# Patient Record
Sex: FEMALE | Race: BLACK OR AFRICAN AMERICAN | Employment: FULL TIME | URBAN - METROPOLITAN AREA
[De-identification: names, ages, dates, MRNs, and addresses within clinical notes are randomized per-mention and may not be internally consistent; named-entity substitution may affect disease eponyms.]

---

## 2021-03-26 ENCOUNTER — HOSPITAL ENCOUNTER (INPATIENT)
Age: 51
LOS: 1 days | Discharge: LEFT BEFORE DISCHARGE | DRG: 948 | End: 2021-03-27
Attending: EMERGENCY MEDICINE | Admitting: FAMILY MEDICINE

## 2021-03-26 DIAGNOSIS — I50.9 CONGESTIVE HEART FAILURE, UNSPECIFIED HF CHRONICITY, UNSPECIFIED HEART FAILURE TYPE (HCC): ICD-10-CM

## 2021-03-26 DIAGNOSIS — R60.9 EDEMA, UNSPECIFIED TYPE: Primary | ICD-10-CM

## 2021-03-26 DIAGNOSIS — J84.9 INTERSTITIAL LUNG DISEASE (HCC): ICD-10-CM

## 2021-03-26 PROCEDURE — 80053 COMPREHEN METABOLIC PANEL: CPT

## 2021-03-26 PROCEDURE — 84484 ASSAY OF TROPONIN QUANT: CPT

## 2021-03-26 PROCEDURE — 74011250636 HC RX REV CODE- 250/636: Performed by: EMERGENCY MEDICINE

## 2021-03-26 PROCEDURE — 36415 COLL VENOUS BLD VENIPUNCTURE: CPT

## 2021-03-26 PROCEDURE — 85025 COMPLETE CBC W/AUTO DIFF WBC: CPT

## 2021-03-26 PROCEDURE — 96374 THER/PROPH/DIAG INJ IV PUSH: CPT

## 2021-03-26 PROCEDURE — 99284 EMERGENCY DEPT VISIT MOD MDM: CPT

## 2021-03-26 PROCEDURE — 83880 ASSAY OF NATRIURETIC PEPTIDE: CPT

## 2021-03-26 RX ORDER — FUROSEMIDE 10 MG/ML
20 INJECTION INTRAMUSCULAR; INTRAVENOUS ONCE
Status: COMPLETED | OUTPATIENT
Start: 2021-03-26 | End: 2021-03-26

## 2021-03-26 RX ADMIN — FUROSEMIDE 20 MG: 10 INJECTION, SOLUTION INTRAMUSCULAR; INTRAVENOUS at 23:22

## 2021-03-26 NOTE — ED NOTES
Pt now reporting her SOB is no worse than usual and she does not wish to be evaluated for it. She is only interested in receiving a diuretic.

## 2021-03-26 NOTE — ED TRIAGE NOTES
Triage: Pt arrives from home accompanied by female  reporting shortness of breath. She reports she is on a transplant list with [facility] for a new lung. She Also reports some BLE edema. She was recently prescribed a diuretic but cannot recall the name. She is awaiting the arrival of this medication from her mail order service but it will not arrive for 7-10 days.

## 2021-03-26 NOTE — Clinical Note
Status[de-identified] INPATIENT [101]  Type of Bed: Telemetry [19]  Inpatient Hospitalization Certified Necessary for the Following Reasons: 3.  Patient receiving treatment that can only be provided in an inpatient setting (further clarification in H&P documentati on)  Admitting Diagnosis: Respiratory failure with hypoxia [facility]) [[id number]]  Admitting Physician: Karin Rod [[id number]]  Attending Physician: Karin Rod [[id number]]  Estimated Length of Stay: 3-4 Midnights  Discharge Plan[de-identified] Home with Office Celestine Mena

## 2021-03-27 ENCOUNTER — APPOINTMENT (OUTPATIENT)
Dept: GENERAL RADIOLOGY | Age: 51
DRG: 948 | End: 2021-03-27
Attending: FAMILY MEDICINE

## 2021-03-27 VITALS
RESPIRATION RATE: 20 BRPM | DIASTOLIC BLOOD PRESSURE: 77 MMHG | HEART RATE: 102 BPM | SYSTOLIC BLOOD PRESSURE: 110 MMHG | OXYGEN SATURATION: 100 % | WEIGHT: 225.31 LBS | BODY MASS INDEX: 36.37 KG/M2 | TEMPERATURE: 97.3 F

## 2021-03-27 PROBLEM — J96.91 RESPIRATORY FAILURE WITH HYPOXIA (HCC): Status: ACTIVE | Noted: 2021-03-27

## 2021-03-27 LAB
ALBUMIN SERPL-MCNC: 2.7 G/DL (ref 3.5–5)
ALBUMIN/GLOB SERPL: 0.5 {RATIO} (ref 1.1–2.2)
ALP SERPL-CCNC: 143 U/L (ref 45–117)
ALT SERPL-CCNC: 51 U/L (ref 12–78)
ANION GAP SERPL CALC-SCNC: 6 MMOL/L (ref 5–15)
ANION GAP SERPL CALC-SCNC: 9 MMOL/L (ref 5–15)
AST SERPL-CCNC: 47 U/L (ref 15–37)
BASOPHILS # BLD: 0 K/UL (ref 0–0.1)
BASOPHILS # BLD: 0 K/UL (ref 0–0.1)
BASOPHILS NFR BLD: 0 % (ref 0–1)
BASOPHILS NFR BLD: 1 % (ref 0–1)
BILIRUB SERPL-MCNC: 0.8 MG/DL (ref 0.2–1)
BNP SERPL-MCNC: 1691 PG/ML
BUN SERPL-MCNC: 3 MG/DL (ref 6–20)
BUN SERPL-MCNC: 3 MG/DL (ref 6–20)
BUN/CREAT SERPL: 12 (ref 12–20)
BUN/CREAT SERPL: 12 (ref 12–20)
CALCIUM SERPL-MCNC: 8.4 MG/DL (ref 8.5–10.1)
CALCIUM SERPL-MCNC: 8.7 MG/DL (ref 8.5–10.1)
CHLORIDE SERPL-SCNC: 93 MMOL/L (ref 97–108)
CHLORIDE SERPL-SCNC: 95 MMOL/L (ref 97–108)
CO2 SERPL-SCNC: 23 MMOL/L (ref 21–32)
CO2 SERPL-SCNC: 27 MMOL/L (ref 21–32)
CREAT SERPL-MCNC: 0.25 MG/DL (ref 0.55–1.02)
CREAT SERPL-MCNC: 0.26 MG/DL (ref 0.55–1.02)
DIFFERENTIAL METHOD BLD: ABNORMAL
DIFFERENTIAL METHOD BLD: ABNORMAL
EOSINOPHIL # BLD: 0.1 K/UL (ref 0–0.4)
EOSINOPHIL # BLD: 0.1 K/UL (ref 0–0.4)
EOSINOPHIL NFR BLD: 2 % (ref 0–7)
EOSINOPHIL NFR BLD: 2 % (ref 0–7)
ERYTHROCYTE [DISTWIDTH] IN BLOOD BY AUTOMATED COUNT: 14.7 % (ref 11.5–14.5)
ERYTHROCYTE [DISTWIDTH] IN BLOOD BY AUTOMATED COUNT: 15.1 % (ref 11.5–14.5)
GLOBULIN SER CALC-MCNC: 5.5 G/DL (ref 2–4)
GLUCOSE SERPL-MCNC: 75 MG/DL (ref 65–100)
GLUCOSE SERPL-MCNC: 88 MG/DL (ref 65–100)
HCT VFR BLD AUTO: 36.6 % (ref 35–47)
HCT VFR BLD AUTO: 37.7 % (ref 35–47)
HGB BLD-MCNC: 12 G/DL (ref 11.5–16)
HGB BLD-MCNC: 12.2 G/DL (ref 11.5–16)
IMM GRANULOCYTES # BLD AUTO: 0 K/UL
IMM GRANULOCYTES # BLD AUTO: 0 K/UL (ref 0–0.04)
IMM GRANULOCYTES NFR BLD AUTO: 0 %
IMM GRANULOCYTES NFR BLD AUTO: 1 % (ref 0–0.5)
LYMPHOCYTES # BLD: 0.7 K/UL (ref 0.8–3.5)
LYMPHOCYTES # BLD: 0.7 K/UL (ref 0.8–3.5)
LYMPHOCYTES NFR BLD: 20 % (ref 12–49)
LYMPHOCYTES NFR BLD: 23 % (ref 12–49)
MAGNESIUM SERPL-MCNC: 1.6 MG/DL (ref 1.6–2.4)
MCH RBC QN AUTO: 31 PG (ref 26–34)
MCH RBC QN AUTO: 31.3 PG (ref 26–34)
MCHC RBC AUTO-ENTMCNC: 32.4 G/DL (ref 30–36.5)
MCHC RBC AUTO-ENTMCNC: 32.8 G/DL (ref 30–36.5)
MCV RBC AUTO: 95.3 FL (ref 80–99)
MCV RBC AUTO: 95.7 FL (ref 80–99)
MONOCYTES # BLD: 0.4 K/UL (ref 0–1)
MONOCYTES # BLD: 0.4 K/UL (ref 0–1)
MONOCYTES NFR BLD: 12 % (ref 5–13)
MONOCYTES NFR BLD: 12 % (ref 5–13)
NEUTS BAND NFR BLD MANUAL: 4 % (ref 0–6)
NEUTS SEG # BLD: 2 K/UL (ref 1.8–8)
NEUTS SEG # BLD: 2.5 K/UL (ref 1.8–8)
NEUTS SEG NFR BLD: 59 % (ref 32–75)
NEUTS SEG NFR BLD: 64 % (ref 32–75)
NRBC # BLD: 0 K/UL (ref 0–0.01)
NRBC # BLD: 0 K/UL (ref 0–0.01)
NRBC BLD-RTO: 0 PER 100 WBC
NRBC BLD-RTO: 0 PER 100 WBC
OSMOLALITY UR: 85 MOSM/KG H2O
PLATELET # BLD AUTO: 273 K/UL (ref 150–400)
PLATELET # BLD AUTO: 317 K/UL (ref 150–400)
PLATELET COMMENTS,PCOM: ABNORMAL
PMV BLD AUTO: 9.1 FL (ref 8.9–12.9)
PMV BLD AUTO: 9.4 FL (ref 8.9–12.9)
POTASSIUM SERPL-SCNC: 3.6 MMOL/L (ref 3.5–5.1)
POTASSIUM SERPL-SCNC: 4.4 MMOL/L (ref 3.5–5.1)
PROT SERPL-MCNC: 8.2 G/DL (ref 6.4–8.2)
RBC # BLD AUTO: 3.84 M/UL (ref 3.8–5.2)
RBC # BLD AUTO: 3.94 M/UL (ref 3.8–5.2)
RBC MORPH BLD: ABNORMAL
SODIUM SERPL-SCNC: 125 MMOL/L (ref 136–145)
SODIUM SERPL-SCNC: 128 MMOL/L (ref 136–145)
SODIUM UR-SCNC: 27 MMOL/L
TROPONIN I SERPL-MCNC: <0.05 NG/ML
WBC # BLD AUTO: 3.2 K/UL (ref 3.6–11)
WBC # BLD AUTO: 3.7 K/UL (ref 3.6–11)

## 2021-03-27 PROCEDURE — 83735 ASSAY OF MAGNESIUM: CPT

## 2021-03-27 PROCEDURE — 84300 ASSAY OF URINE SODIUM: CPT

## 2021-03-27 PROCEDURE — 36415 COLL VENOUS BLD VENIPUNCTURE: CPT

## 2021-03-27 PROCEDURE — 65660000000 HC RM CCU STEPDOWN

## 2021-03-27 PROCEDURE — 85025 COMPLETE CBC W/AUTO DIFF WBC: CPT

## 2021-03-27 PROCEDURE — 71045 X-RAY EXAM CHEST 1 VIEW: CPT

## 2021-03-27 PROCEDURE — 80048 BASIC METABOLIC PNL TOTAL CA: CPT

## 2021-03-27 PROCEDURE — 83935 ASSAY OF URINE OSMOLALITY: CPT

## 2021-03-27 RX ORDER — ENOXAPARIN SODIUM 100 MG/ML
40 INJECTION SUBCUTANEOUS DAILY
Status: DISCONTINUED | OUTPATIENT
Start: 2021-03-27 | End: 2021-03-27 | Stop reason: HOSPADM

## 2021-03-27 RX ORDER — SODIUM CHLORIDE 0.9 % (FLUSH) 0.9 %
5-40 SYRINGE (ML) INJECTION AS NEEDED
Status: DISCONTINUED | OUTPATIENT
Start: 2021-03-27 | End: 2021-03-27 | Stop reason: HOSPADM

## 2021-03-27 RX ORDER — SODIUM CHLORIDE 0.9 % (FLUSH) 0.9 %
5-40 SYRINGE (ML) INJECTION EVERY 8 HOURS
Status: DISCONTINUED | OUTPATIENT
Start: 2021-03-27 | End: 2021-03-27 | Stop reason: HOSPADM

## 2021-03-27 RX ORDER — ACETAMINOPHEN 325 MG/1
650 TABLET ORAL
Status: DISCONTINUED | OUTPATIENT
Start: 2021-03-27 | End: 2021-03-27 | Stop reason: HOSPADM

## 2021-03-27 RX ORDER — ACETAMINOPHEN 650 MG/1
650 SUPPOSITORY RECTAL
Status: DISCONTINUED | OUTPATIENT
Start: 2021-03-27 | End: 2021-03-27 | Stop reason: HOSPADM

## 2021-03-27 NOTE — CONSULTS
[facility] [facility] Adult  Hospitalist Group  ED Consult    Primary Care Provider: Shayla Garcia MD  Date of Service:  3/27/2021    Subjective:     Philip Acevedo is a 46 y.o. female with hx Sjogren's syndrome, ILD on chronic O2 tx, EMY on cpap. . She presents with CC LE edema. Her pulmonologist recommended diuretic which she has not had filled yet. In the ED, VSS measured on home value of 6Lnc. Labs showed neutropenia with WBC 3.2, hyponatremia with Na 125, albumin 2.7, globulin 5.5 , alk phos 143, probnp 1691, EKG nsr 86     In the ED, she rec'd 20mg IV lasix    Review of Systems:    All other review of systems were negative except for that written in the History of Present Illness. Past Medical History:   Diagnosis Date    Sjogrens syndrome ([facility] [address]       History reviewed. No pertinent surgical history. Prior to Admission medications    Medication Sig Start Date End Date Taking? Authorizing Provider   predniSONE (DELTASONE) 20 mg tablet Prednisone 20mg   3 tabs daily x 2 days starting 9/13  2 tabs daily x 3 days  1 tab daily x 3 days 9/12/16   MARSHALL Kam   azaTHIOprine (IMURAN) 50 mg tablet Take 50 mg by mouth daily. Other, MD Vega   levofloxacin (LEVAQUIN) 750 mg tablet Take 1 tablet by mouth daily. 1/6/15   Rosy Blair MD   benzonatate (TESSALON PERLES) 100 mg capsule Take 1 capsule by mouth three (3) times daily as needed for Cough. 1/6/15   Rosy Blair MD   guaiFENesin-codeine (ROBITUSSIN AC)  mg/5 mL solution Take 5 mL by mouth three (3) times daily as needed for Cough. 1/6/15   Rosy Blair MD   albuterol (PROVENTIL HFA, VENTOLIN HFA, PROAIR HFA) 90 mcg/actuation inhaler Take 2 puffs by inhalation every six (6) hours as needed for Wheezing. 1/6/15   Rosy Blair MD     Allergies   Allergen Reactions    Sulfa (Sulfonamide Antibiotics) Nausea and Vomiting      History reviewed. No pertinent family history.      SOCIAL HISTORY:  Patient resides at Home.  Smoking history: none  Alcohol history: none        Objective:       Physical Exam:   Visit Vitals  BP (!) 123/96   Pulse 71   Temp 97.3 °F (36.3 °C)   Resp 20   SpO2 97%     General:  Alert, cooperative, no distress   Head:  Normocephalic, without obvious abnormality, atraumatic. Eyes:  Conjunctivae/corneas clear. EOMs intact. Nose: Nasal cannula. Septum midline. Throat: Lips, mucosa, and tongue normal.    Neck: Supple, symmetrical, trachea midline   Back:   Symmetric, no curvature. ROM normal.    Lungs:   Clear to auscultation bilaterally. Chest wall:  No tenderness or deformity. Heart:  Tachycardia to low 110 with frequent extrasystoles, S1, S2 normal, 3/6 systolic murmur LUSB, click, rub or gallop. Abdomen:   Soft, non-tender. Bowel sounds normal. No masses,  No organomegaly. Extremities: Extremities normal, atraumatic, no cyanosis. 2+ b/l LE edema   Pulses: 2+ and symmetric all extremities. Skin: Skin color, texture, turgor normal. No rashes or lesions. Data Review: All diagnostic labs and studies have been reviewed. Chest x-ray underexpanded, bibasilar atelectasis, cardiomegaly, no pneumothorax    Assessment:     Active Problems:    * No active hospital problems. *      Plan:     #B/L LE edema  -?cor pulmonale in the setting of chronic ILD with hypoxemia,and hypalbuminemia  -s/p IV lasix 20 in ED with resultant diuresis  -strict I/O  -b/l LE venous duplex to evaluate for dvt    #Sjogren's with ILD and hypoxia  -on b/l 4L nc  -recommend echo to eval right, and left ventricular function 2/2 high suspicion for cor pulmonale  -pt. Pending transplant w/u at [facility] in June    #Hypoosmolar Hyponatremia  -Na 125, osmolarity 265  -check Meredith and Uosm  -repeat stat BMP    Evaluated pt. In the ED, and recommended admission for observation to eval for dvt, and recheck electrolytes and manage hyponatremia. Pt.  And family member accompanying her stated she did not want admission, and knew that her labs were abnormal, but that she had OP f/u with pulmonology (Dr. Rolanda Brush) and [facility]. Case discussed with Dr. Carl Coughlin, and patient was discharged AMA from theED.     FEN: cardiac  dvt ppx: lovenox  MPOA: Brayden Oconnell [phone]  Code: Full      Signed By: Jorge Temple MD     March 27, 2021

## 2021-03-27 NOTE — H&P
[facility] Adult  Hospitalist Group History and Physical 
 
Primary Care Provider: Efe Pena MD 
Date of Service:  3/27/2021 Subjective:  
 
Karena Monaco is a 46 y.o. female with hx Sjogren's syndrome, ILD on chronic O2 tx, EMY on cpap. . She presents with CC LE edema. Her pulmonologist recommended diuretic which she has not had filled yet. In the ED, VSS measured on home value of 6Lnc. Labs showed neutropenia with WBC 3.2, hyponatremia with Na 125, albumin 2.7, globulin 5.5 , alk phos 143, probnp 1691, EKG nsr 86 In the ED, she rec'd 20mg IV lasix Review of Systems: 
 
All other review of systems were negative except for that written in the History of Present Illness. Past Medical History:  
Diagnosis Date  Sjogrens syndrome ([address] History reviewed. No pertinent surgical history. Prior to Admission medications Medication Sig Start Date End Date Taking? Authorizing Provider  
predniSONE (DELTASONE) 20 mg tablet Prednisone 20mg 3 tabs daily x 2 days starting 9/13 
2 tabs daily x 3 days 1 tab daily x 3 days 9/12/16   MARSHALL Koch  
azaTHIOprine (IMURAN) 50 mg tablet Take 50 mg by mouth daily. Other, MD Vega  
levofloxacin (LEVAQUIN) 750 mg tablet Take 1 tablet by mouth daily. 1/6/15   Lavanda Ormond, MD  
benzonatate (TESSALON PERLES) 100 mg capsule Take 1 capsule by mouth three (3) times daily as needed for Cough. 1/6/15   Lavanda Ormond, MD  
guaiFENesin-codeine (ROBITUSSIN AC)  mg/5 mL solution Take 5 mL by mouth three (3) times daily as needed for Cough. 1/6/15   Lavanda Ormond, MD  
albuterol (PROVENTIL HFA, VENTOLIN HFA, PROAIR HFA) 90 mcg/actuation inhaler Take 2 puffs by inhalation every six (6) hours as needed for Wheezing. 1/6/15   Lavanda Ormond, MD  
 
Allergies Allergen Reactions  Sulfa (Sulfonamide Antibiotics) Nausea and Vomiting History reviewed. No pertinent family history.   
 
SOCIAL HISTORY: 
Patient resides at Home. Smoking history: none Alcohol history: none Objective:  
 
 
Physical Exam:  
Visit Vitals BP (!) 123/96 Pulse 71 Temp 97.3 °F (36.3 °C) Resp 20 SpO2 97% General:  Alert, cooperative, no distress Head:  Normocephalic, without obvious abnormality, atraumatic. Eyes:  Conjunctivae/corneas clear. EOMs intact. Nose: Nasal cannula. Septum midline. Throat: Lips, mucosa, and tongue normal.   
Neck: Supple, symmetrical, trachea midline Back:   Symmetric, no curvature. ROM normal.   
Lungs:   Clear to auscultation bilaterally. Chest wall:  No tenderness or deformity. Heart:  Tachycardia to low 110 with frequent extrasystoles, S1, S2 normal, 3/6 systolic murmur LUSB, click, rub or gallop. Abdomen:   Soft, non-tender. Bowel sounds normal. No masses,  No organomegaly. Extremities: Extremities normal, atraumatic, no cyanosis. 2+ b/l LE edema Pulses: 2+ and symmetric all extremities. Skin: Skin color, texture, turgor normal. No rashes or lesions. Data Review: All diagnostic labs and studies have been reviewed. Chest x-ray underexpanded, bibasilar atelectasis, cardiomegaly, no pneumothorax Assessment:  
 
Active Problems: * No active hospital problems. * 
 
 
Plan:  
 
#B/L LE edema 
-?cor pulmonale in the setting of chronic ILD with hypoxemia,and hypalbuminemia 
-s/p IV lasix 20 in ED with resultant diuresis 
-strict I/O 
-b/l LE venous duplex to evaluate for dvt #Sjogren's with ILD and hypoxia 
-on b/l 4L nc 
-recommend echo to eval right, and left ventricular function 2/2 high suspicion for cor pulmonale 
-pt. Pending transplant w/u at [facility] in June #Hypoosmolar Hyponatremia 
-Na 125, osmolarity 265 
-check Meredith and Uosm 
-repeat stat BMP Evaluated pt. In the ED, and recommended admission for observation to eval for dvt, and recheck electrolytes and manage hyponatremia. Pt.  And family member accompanying her stated she did not want admission, and knew that her labs were abnormal, but that she had OP f/u with pulmonology (Dr. Rosario Garcia) and [facility]. Case discussed with Dr. Pinky Adkins, and patient was discharged AMA from theED. FEN: cardiac 
dvt ppx: lovenox ALLYA: Keely Holloway [phone] Code: Full Signed By: Jose Francisco Delcid MD   
 March 27, 2021

## 2021-03-27 NOTE — DISCHARGE INSTRUCTIONS
You are leaving the hospital [address] You may return at ANY time if you feel your condition is worsening. You have spoken with Dr. Nelson Loaiza and Dr. Eladio Deleon regarding your decision to leave. You have refused all medical services that can safely correct the abnormalities in your blood while delivering diuretics. You will need to obtain any further diuretics from your physician due to the safety concerns related to your low blood pressures and your critically low sodium level. Follow up with your doctors TODAY.

## 2021-03-27 NOTE — ROUTINE PROCESS
TRANSFER - OUT REPORT: 
 
Verbal report given to Mario Bose (name) on [facility]  being transferred to  (unit) for routine progression of care Report consisted of patients Situation, Background, Assessment and  
Recommendations(SBAR). Information from the following report(s) SBAR, Kardex, ED Summary, Intake/Output, MAR and Recent Results was reviewed with the receiving nurse. Lines:  
Peripheral IV 03/26/21 Left Forearm (Active) Site Assessment Clean, dry, & intact 03/26/21 2324 Phlebitis Assessment 0 03/26/21 2324 Infiltration Assessment 0 03/26/21 2324 Dressing Status Clean, dry, & intact 03/26/21 2324 Opportunity for questions and clarification was provided. Patient transported with: 
 Leroy Brothers

## 2021-03-27 NOTE — ED PROVIDER NOTES
Patient is a 78-year-old woman who presents to the emergency department complaining of lower extremity edema. She has interstitial lung disease and is on the transplant list for a lung transplant and is scheduled to see specialist at [facility]. Her pulmonologist prescribed her a diuretic for the swelling which has been going on for at least the past 2 weeks but the patient states that it will be another 7 to 10 days before she can get this filled. Patient expresses a willingness to have a work-up for this edema to assure that there is no electrolyte imbalance that could be worsened by administration of a diuretic. Though she is short of breath the patient states that her shortness of breath feels no worse than her normal baseline. The history is provided by the patient. Shortness of Breath  This is a new problem. Associated symptoms include leg swelling. Leg Swelling   This is a chronic problem. The current episode started more than 1 week ago. Past Medical History:   Diagnosis Date    Sjogrens syndrome ([facility] [address]        History reviewed. No pertinent surgical history. History reviewed. No pertinent family history.     Social History     Socioeconomic History    Marital status: SINGLE     Spouse name: Not on file    Number of children: Not on file    Years of education: Not on file    Highest education level: Not on file   Occupational History    Not on file   Social Needs    Financial resource strain: Not on file    Food insecurity     Worry: Not on file     Inability: Not on file    Transportation needs     Medical: Not on file     Non-medical: Not on file   Tobacco Use    Smoking status: Never Smoker   Substance and Sexual Activity    Alcohol use: Not on file    Drug use: Not on file    Sexual activity: Not on file   Lifestyle    Physical activity     Days per week: Not on file     Minutes per session: Not on file    Stress: Not on file   Relationships    Social connections     Talks on phone: Not on file     Gets together: Not on file     Attends [employer] service: Not on file     Active member of club or organization: Not on file     Attends meetings of clubs or organizations: Not on file     Relationship status: Not on file    Intimate partner violence     Fear of current or ex partner: Not on file     Emotionally abused: Not on file     Physically abused: Not on file     Forced sexual activity: Not on file   Other Topics Concern    Not on file   Social History Narrative    Not on file         ALLERGIES: Sulfa (sulfonamide antibiotics)    Review of Systems   Respiratory: Positive for shortness of breath. Cardiovascular: Positive for leg swelling. All other systems reviewed and are negative. Vitals:    03/26/21 1952 03/27/21 0000   BP: 106/75 (!) 123/96   Pulse: 71    Resp: 20    Temp: 98.1 °F (36.7 °C) 97.3 °F (36.3 °C)   SpO2: 99% 97%            Physical Exam   Significant for 4+ pitting edema of the bilateral lower extremities all the way into the thighs. Significant for diffuse coarse rhonchi bilaterally in her lungs. Patient is on home oxygen. [facility]  ED Course as of Mar 28 1414   Sat Mar 27, 2021   0517 WBC: 3.7 [VT]      ED Course User Index  [VT] Bebe Goldmann, MD   Over course of EM stay both the patient and her family member became belligerent over deciding not to stay in the hospital. Family member was yelling that she wanted to go because she hadn't eaten since Friday. The greatly affected the patients perspective. Patient expressed that her doctors were all aware and she would rather go home. This change of perspective occurred after the patient was evaluated by Dr. Vicky Beckham who was the first to inform me that the patient did not want to be admitted. I had an extensive conversation with the patient and her family member regarding the risks and benefits associate with hypotension, hyponatremia and diuretic use.   Informed her that she would be discharged against medical advice and she should follow up with her primary care physician or specialist for further diuretic treatment. Procedures      Perfect Serve Consult for Admission  2:09 AM    ED Room Number: RC11/96  Patient Name and age:  Maryse Meier 46 y.o.  female  Working Diagnosis:   1. Edema, unspecified type    2. Interstitial lung disease ([address]    3. Congestive heart failure, unspecified HF chronicity, unspecified heart failure type ([facility] [address]        COVID-19 Suspicion:  no  Sepsis present:  no  Reassessment needed: yes  Code Status:  Full Code  Readmission: no  Isolation Requirements:  no  Recommended Level of Care:  telemetry  Department:[facility] Adult ED - 21   Other:  50yo female on [facility] lung transplant list presents with 4+ edema of bilateral lower extremities. Patient was prescribed diuretics but has not received the mail order prescription yet. In the ED, she was given Lasix with good diuresis and slight improvement of her LE edema. Original plan was to discharge patient home with a short term prescription for lasix but her BP has dropped to 95 systolic. Patient states her blood pressures are normally low (usually 228 systolic). BNP is elevated at 1691, troponin is negative. Requesting admission for further management and controlled diuresis. LABORATORY TESTS:   Labs Reviewed   CBC WITH AUTOMATED DIFF - Abnormal; Notable for the following components:       Result Value    WBC 3.2 (*)     RDW 14.7 (*)     ABS. LYMPHOCYTES 0.7 (*)     All other components within normal limits   METABOLIC PANEL, COMPREHENSIVE - Abnormal; Notable for the following components:    Sodium 125 (*)     Chloride 93 (*)     BUN 3 (*)     Creatinine 0.25 (*)     AST (SGOT) 47 (*)     Alk.  phosphatase 143 (*)     Albumin 2.7 (*)     Globulin 5.5 (*)     A-G Ratio 0.5 (*)     All other components within normal limits   NT-PRO BNP - Abnormal; Notable for the following components:    NT pro-BNP 1,691 (*)     All other components within normal limits   METABOLIC PANEL, BASIC - Abnormal; Notable for the following components:    Sodium 128 (*)     Chloride 95 (*)     BUN 3 (*)     Creatinine 0.26 (*)     Calcium 8.4 (*)     All other components within normal limits   CBC WITH AUTOMATED DIFF - Abnormal; Notable for the following components:    RDW 15.1 (*)     IMMATURE GRANULOCYTES 1 (*)     ABS. LYMPHOCYTES 0.7 (*)     All other components within normal limits   TROPONIN I   MAGNESIUM   SODIUM, UR, RANDOM   OSMOLALITY, UR         IMAGING RESULTS:   XR CHEST PORT   Final Result   Underexpanded lungs with bibasilar atelectasis and cardiomegaly. No pulmonary   edema or pneumothorax. MEDICATIONS GIVEN:  Medications   furosemide (LASIX) injection 20 mg (20 mg IntraVENous Given 3/26/21 2322)       IMPRESSION:  1. Edema, unspecified type    2. Interstitial lung disease ([address]    3.  Congestive heart failure, unspecified HF chronicity, unspecified heart failure type ([address]        PLAN:  1.  [facility]

## 2021-04-28 ENCOUNTER — NURSE TRIAGE (OUTPATIENT)
Dept: OTHER | Facility: CLINIC | Age: 51
End: 2021-04-28

## 2021-04-28 NOTE — TELEPHONE ENCOUNTER
Reason for Disposition   General information question, no triage required and triager able to answer question    Protocols used: INFORMATION ONLY CALL - NO TRIAGE-ADULT-    Caller looking for info on recent hospital stay. Unable to provide info for her. Transferred to Employee benefit line.

## 2021-06-17 ENCOUNTER — PATIENT OUTREACH (OUTPATIENT)
Dept: OTHER | Age: 51
End: 2021-06-17

## 2021-06-17 NOTE — PROGRESS NOTES
Patient on daily census for hospitalization in April at [facility]. Upon further investigation, patient is awaiting lung transplant and has been confirmed by VINNY March, to be working with a transplant team member.   No outreach necessary by the Associate Care Management team.